# Patient Record
Sex: MALE | Race: WHITE | URBAN - METROPOLITAN AREA
[De-identification: names, ages, dates, MRNs, and addresses within clinical notes are randomized per-mention and may not be internally consistent; named-entity substitution may affect disease eponyms.]

---

## 2019-08-12 ENCOUNTER — OFFICE VISIT (OUTPATIENT)
Dept: SURGERY | Age: 44
End: 2019-08-12

## 2019-08-12 VITALS
RESPIRATION RATE: 20 BRPM | DIASTOLIC BLOOD PRESSURE: 92 MMHG | TEMPERATURE: 97.8 F | HEIGHT: 73 IN | WEIGHT: 315 LBS | BODY MASS INDEX: 41.75 KG/M2 | HEART RATE: 84 BPM | SYSTOLIC BLOOD PRESSURE: 145 MMHG

## 2019-08-12 DIAGNOSIS — E78.2 MIXED HYPERLIPIDEMIA: ICD-10-CM

## 2019-08-12 DIAGNOSIS — I10 ESSENTIAL HYPERTENSION: ICD-10-CM

## 2019-08-12 DIAGNOSIS — G47.33 OSA (OBSTRUCTIVE SLEEP APNEA): ICD-10-CM

## 2019-08-12 DIAGNOSIS — E66.01 MORBID OBESITY (HCC): Primary | ICD-10-CM

## 2019-08-12 RX ORDER — AZELASTINE 1 MG/ML
2 SPRAY, METERED NASAL
COMMUNITY
Start: 2018-11-02

## 2019-08-12 RX ORDER — FLUTICASONE PROPIONATE 50 MCG
SPRAY, SUSPENSION (ML) NASAL
COMMUNITY

## 2019-08-12 RX ORDER — CYCLOBENZAPRINE HCL 10 MG
10 TABLET ORAL
COMMUNITY
Start: 2018-10-08

## 2019-08-12 RX ORDER — LISINOPRIL 20 MG/1
TABLET ORAL
Refills: 6 | COMMUNITY
Start: 2019-07-19

## 2019-08-12 RX ORDER — ONDANSETRON 4 MG/1
8 TABLET, ORALLY DISINTEGRATING ORAL
COMMUNITY
Start: 2018-01-15 | End: 2019-08-12

## 2019-08-12 RX ORDER — BUDESONIDE AND FORMOTEROL FUMARATE DIHYDRATE 160; 4.5 UG/1; UG/1
AEROSOL RESPIRATORY (INHALATION)
COMMUNITY
Start: 2018-08-17

## 2019-08-12 RX ORDER — PREDNISONE 20 MG/1
TABLET ORAL
COMMUNITY
Start: 2019-06-10 | End: 2019-08-12

## 2019-08-12 RX ORDER — ALBUTEROL SULFATE 0.83 MG/ML
SOLUTION RESPIRATORY (INHALATION)
COMMUNITY
Start: 2018-02-05

## 2019-08-12 RX ORDER — AZELASTINE HYDROCHLORIDE 0.5 MG/ML
SOLUTION/ DROPS OPHTHALMIC 2 TIMES DAILY
COMMUNITY

## 2019-08-12 RX ORDER — SUMATRIPTAN 50 MG/1
TABLET, FILM COATED ORAL
COMMUNITY
Start: 2018-02-06

## 2019-08-12 RX ORDER — LEVOCETIRIZINE DIHYDROCHLORIDE 5 MG/1
TABLET, FILM COATED ORAL
COMMUNITY
Start: 2018-02-06

## 2019-08-12 RX ORDER — ALBUTEROL SULFATE 90 UG/1
AEROSOL, METERED RESPIRATORY (INHALATION)
COMMUNITY
Start: 2019-03-29

## 2019-08-12 NOTE — PROGRESS NOTES
Initial Consultation for Bariatric Surgery Template (Gastric Bypass)    Lena Aaron is a 38 y.o. male who comes into the office today for initial consultation for the surgical options for the treatment of morbid obesity. The patient initially identified obesity at the age of 25 and at age 26 weighed 220 lbs. He has tried a variety of unsupervised weight-loss attempts including Weight Watchers, registered dietician, family physician, Nutrisystem and Phentermine, but has yet to meet with lasting success. Maximum weight lost on a diet is about 15 lbs, but that the weight loss always seems to return. Today, the patient is  Height: 6' 1\" (185.4 cm) tall, Weight: 155.1 kg (342 lb) lbs for a Body mass index is 45.12 kg/m². It is due to the patient's severe obesity, which is further complicated by hypertension, hyperlipidemia and obstructive sleep apnea - CPAP  that the patient is now seeking out bariatric surgery, specifically, sleeve gastrectomy. Past Medical History:   Diagnosis Date    Arthritis     Basal cell carcinoma 2016    Depression     Hypercholesterolemia     Hypertension     Sleep apnea        Past Surgical History:   Procedure Laterality Date    HX OTHER SURGICAL  2016    Basal cell removed from nose    HX TONSIL AND ADENOIDECTOMY         Current Outpatient Medications on File Prior to Visit   Medication Sig Dispense Refill    albuterol (PROVENTIL VENTOLIN) 2.5 mg /3 mL (0.083 %) nebu albuterol sulfate 2.5 mg/3 mL (0.083 %) solution for nebulization      albuterol (VENTOLIN HFA) 90 mcg/actuation inhaler Ventolin HFA 90 mcg/actuation aerosol inhaler      budesonide-formoterol (SYMBICORT) 160-4.5 mcg/actuation HFAA Symbicort 160 mcg-4.5 mcg/actuation HFA aerosol inhaler      cyclobenzaprine (FLEXERIL) 10 mg tablet Take 10 mg by mouth.       levocetirizine (XYZAL) 5 mg tablet levocetirizine 5 mg tablet      lisinopril (PRINIVIL, ZESTRIL) 20 mg tablet   6    SUMAtriptan (IMITREX) 50 mg tablet sumatriptan 50 mg tablet      vortioxetine (TRINTELLIX) 5 mg tablet Trintellix 5 mg tablet      azelastine (OPTIVAR) 0.05 % ophthalmic solution Administer  to both eyes two (2) times a day. Use in affected eye(s)      azelastine (ASTELIN) 137 mcg (0.1 %) nasal spray 2 Sprays by Nasal route.  fluticasone propionate (FLONASE) 50 mcg/actuation nasal spray fluticasone propionate 50 mcg/actuation nasal spray,suspension       No current facility-administered medications on file prior to visit.         Allergies   Allergen Reactions    Pcn [Penicillins] Itching       Social History     Tobacco Use    Smoking status: Never Smoker    Smokeless tobacco: Never Used   Substance Use Topics    Alcohol use: Yes     Comment: occ    Drug use: Not on file       Family History   Problem Relation Age of Onset   Slaughter Cancer Mother         melanoma,     Hypertension Mother     High Cholesterol Mother     Hypertension Father     Heart Disease Father        Family Status   Relation Name Status    Mother  [de-identified]    Father  Alive       Review of Systems:  Positive in BOLD    CONST: Fever, weight loss, fatigue or chills  GI: Nausea, vomiting, abdominal pain, change in bowel habits, hematochezia, melena, and GERD - red sauces - rx with OTC   INTEG: Dermatitis, abnormal moles  HEENT: Recent changes in vision, vertigo, epistaxis, dysphagia and hoarseness  CV: Chest pain, palpitations, HTN, edema and varicosities  RESP: Cough, shortness of breath, wheezing, hemoptysis, snoring and reactive airway disease  : Hematuria, dysuria, frequency, urgency, nocturia and stress urinary incontinence   MS: Weakness, joint pain and arthritis - knees and back  ENDO: Diabetes, thyroid disease, polyuria, polydipsia, polyphagia, poor wound healing, heat intolerance, cold intolerance  LYMPH/HEME: Anemia, bruising and history of blood transfusions  NEURO: Dizziness, headache, fainting, seizures and stroke  PSYCH: Anxiety and depression      Physical Exam    Visit Vitals  BP (!) 145/92 (BP 1 Location: Left arm, BP Patient Position: At rest)   Pulse 84   Temp 97.8 °F (36.6 °C) (Oral)   Resp 20   Ht 6' 1\" (1.854 m)   Wt 155.1 kg (342 lb)   BMI 45.12 kg/m²       Pre op weight: 342  EBW: 157  Wt loss to date: 0       General: 38 y.o.) male in no acute distress. Morbidly obese in abdomen - android pattern  HEENT: Normocephalic, atraumatic, Pupils equal and reactive, nasopharynx clear, oropharynx clear and moist without lesions, heavy bags under his eyes. NECK: Supple, no lymphadenopathy, thyromegaly, carotid bruits or jugular venous distension. trachea midline  RESP: Clear to auscultation bilaterally, no wheezes, rhonchi, or rales, normal respiratory excursion  CV: Regular rate and rhythm, no murmurs, rubs or gallops. 3+/4 pulses in bilateral dorsalis pedis and posterior tibialis. No distal edema or varicosities. ABD: Soft, nontender, nondistended, normoactive bowel sounds, no hernias, no hepatosplenomegaly, easily palpable costal margins, android distribution  Extremities: Warm, well perfused, no tenderness or swelling, normal gait/station  Neuro: Sensation and strength grossly intact and symmetrical  Psych: Alert and oriented to person, place, and time. Impression:    Saurav Rene is a 38 y.o. male who is suffering from morbid obesity with a BMI of 45  and comorbidities including hypertension, hyperlipidemia, GERD, obstructive sleep apnea - CPAP and weight related arthopathies  who would benefit from bariatric surgery. We have had an extensive discussion with regard to the risks, benefits and likely outcomes of the operation. We've discussed the restrictive and malabsorptive nature of the gastric bypass and compared and contrasted with the sleeve gastrectomy. The patient understands the likelihood of losing approximately 80% of their excess weight in 12 to 18 months.  He also understands the risks including but not limited to bleeding, infection, need for reoperation, ulcers, leaks and strictures, bowel obstruction secondary to adhesions and internal hernias, DVT, PE, heart attack, stroke, and death. Patient also understands risks of inadequate weight loss, excess weight loss, vitamin insufficiency, protein malnutrition, excess skin, and loss of hair. We have reviewed the components of a successful postoperative course including requirement for a high protein, low carbohydrate diet, 60 oz a day of zero calorie liquids, daily vitamin supplementation, daily exercise, regular follow-up, and participation in support groups. At this time we will enroll the patient in our bariatric program, undertake routine laboratory evaluation, chest X-ray, EKG, possible UGI and evaluation by  nutritionist as well as psychologist and pending their satisfactory completion of the preop evaluation, plan to perform a gastric bypass.

## 2019-08-12 NOTE — PROGRESS NOTES
Anna Solitario is a 38 y.o. male who presents today with   Chief Complaint   Patient presents with    Morbid Obesity     Consult                Body mass index is 45.12 kg/m². 1. Have you been to the ER, urgent care clinic since your last visit? Hospitalized since your last visit? No    2. Have you seen or consulted any other health care providers outside of the [address] [place] [address] since your last visit? Include any pap smears or colon screening.  No

## 2019-08-29 ENCOUNTER — DOCUMENTATION ONLY (OUTPATIENT)
Dept: SURGERY | Age: 44
End: 2019-08-29

## 2019-08-29 NOTE — PROGRESS NOTES
[facility] [place] [facility] [address] [facility]  [id number] [place] [address], [address]    Patient's Name: Haley Lentz   Age: 38 y.o. YOB: 1975   Sex: male    Date:  08/12/2019    Session: 1 of 12  Surgeon:  Dr. Vielka Loyd     Height: 6'1\" Weight:    342      Lbs. BMI: 45     Starting Weight: 342       Do you smoke? no    Alcohol intake:    Number of drinks at a time:  1-2  Number of times a month: 1    Class Guidelines    Guidelines are reviewed with patient at the start of every class. 1. Patient understands that weight loss trial classes must be consecutive. Patient understands if they miss a class, it is their responsibility to contact me to reschedule class. I will reach out to patient after their first no show. 2.  Patient understands the expectations that weight maintenance/weight loss is expected during the classes. Failure to demonstrate changes may result in one extra month of weight loss trial, followed by going back to see the surgeon. Patient understands that they CAN NOT gain any weight during the weight loss trial.  Gaining weight will result in extra classes. 3. Patient is also instructed to be doing their labs, blood work, psych visit, support group and any other test that the surgeon has used while they are working on their weight loss trial.  4.  Patient was instructed to bring their blue binder to every class and appointment. Eating Habits and Behaviors    Today in class, we reviewed the key diet principles. I have talked to patient about pushing the fluid and working towards 64 ounces per day. We focused on following a low-calorie diet. Patient was instructed to count their carbohydrates and try to keep their daily intake under 100 grams per day and try to keep their daily protein at 80 grams per day. Patient was given examples of carbohydrates in starches.   Patient was encouraged to focus on meat and vegetables and begin cutting carbohydrates out. We talked about foods that are protein-based and how to incorporate those into their meals. I also reviewed with patient the importance of eating 3 meals per day and suggestions were made for breakfast items. Patient's current diet habits include:   Breakfast:  Meat no bread. Lunch: Fast food. Dinner:  Chicken, pizza, steak. Physical Activity/Exercise    Comments: We talked about exercise. Patient was given reasons of why exercise is so important and how that can help with their long-term success. I have encouraged patient to get a support system to help with the activity. Behavior Modification       Comments:  During today's lesson I discussed the importance of making up to two lifestyle changes a week and adding new changes as the other changes become routine. Examples of lifestyle changes are:  1. Not eating in front of the TV or computer. 2. Journaling intake via an mathieu or pen and paper. This reveals negative patterns such as mindless eating, stress eating and late night eating for other reason then hunger. Goals that patient wants to work on includes:  Increase activity. Decrease portions sizes and make healthier choices.       Nico Montiel RD

## 2019-09-29 ENCOUNTER — DOCUMENTATION ONLY (OUTPATIENT)
Dept: SURGERY | Age: 44
End: 2019-09-29

## 2019-09-29 NOTE — PROGRESS NOTES
[facility]  [address] [facility], [address], [place]  Patient's Name: Romulo Quiñonez   Age: 38 y.o. YOB: 1975   Sex: male    Date:  09/09/2019    Session: 2 over 12 months. Surgeon:  Eric Nelson    Height: 6'1\" Weight:    336      Lbs. BMI: 45     Starting Weight: 342        Do you smoke? no    Alcohol intake:    Number of drinks at a time:  1-2  Number of times a month. Class Guidelines    Guidelines are reviewed with patient at the start of every class. 1. Patient understands that weight loss trial classes must be consecutive. Patient understands if they miss a class, it is their responsibility to contact me to reschedule class. I will reach out to patient after their first no show. 2.  Patient understands the expectations that weight maintenance/weight loss is expected during the classes. Failure to demonstrate changes may result in one extra month of weight loss trial, followed by going back to see the surgeon. 3. Patient is also instructed to be doing their labs, blood work, psych visit, support group and any other test that the surgeon has used while they are working on their weight loss trial.    Changes Made Since Last Class:   Changed eating to low carb and low sugar. Eating Habits and Behaviors      Today we reviewed key diet principles. We talked about ways that patient can follow a low calorie diet. These included: Stopping all liquid calories and patient was given a list of fluid choices that would be appropriate. We talked about carbohydrates. Patient was educated that all carbohydrates turn to sugar and was encouraged to stick to the complex carbohydrates while in weight loss trials, but aim to keep less than 100 grams during weight loss trials. Patient was given a list of foods to not eat and drink due to high sugar, high fat, or high carbohydrate content.   Patient was also given a list of foods and drinks that are high protein, low carbohydrate, and would be appropriate to eat. Patient was given a list of fruit and what a portion size is and how many carbohydrates it contains. Patient was encouraged to keep fruit intake to a minimum. Patient was also given a list of 50 low carbohydrate snack options, but was also cautioned that some of the choices still were high in calories and portion control should be practiced. Patient's current diet habits include: sausages, eggs, chicken, broccoli, fish. Physical Activity/Exercise    Comments:     Currently for exercise, patient is walking. We talked about activities for patient to do, including walking, swimming, or chair exercises. Behavior Modification       Comments:   Patient was encouraged to food journal. I talked with patient about tracking their daily carbohydrate. We also talked about the importance of planning ahead. Lack of willpower is often a lack of planning. We also covered the need to get sufficient sleep and ways to accomplish that. We also discussed how important it is to not snack mindlessly in the evening and to consider dinner the last meal of the day. Goals for next month: Increase activity. Decrease portions size.       Michelle Fang RD

## 2019-10-10 ENCOUNTER — OFFICE VISIT (OUTPATIENT)
Dept: SURGERY | Age: 44
End: 2019-10-10

## 2019-10-10 VITALS
HEIGHT: 73 IN | DIASTOLIC BLOOD PRESSURE: 85 MMHG | OXYGEN SATURATION: 95 % | BODY MASS INDEX: 41.75 KG/M2 | TEMPERATURE: 98.3 F | SYSTOLIC BLOOD PRESSURE: 131 MMHG | WEIGHT: 315 LBS | HEART RATE: 68 BPM

## 2019-10-10 DIAGNOSIS — G47.33 OSA (OBSTRUCTIVE SLEEP APNEA): ICD-10-CM

## 2019-10-10 DIAGNOSIS — E66.01 MORBID OBESITY (HCC): Primary | ICD-10-CM

## 2019-10-10 DIAGNOSIS — I10 ESSENTIAL HYPERTENSION: ICD-10-CM

## 2019-10-10 DIAGNOSIS — E78.2 MIXED HYPERLIPIDEMIA: ICD-10-CM

## 2019-10-10 NOTE — PROGRESS NOTES
Bariatric Preoperative Progress Note    Subjective:     Geovanni Santoyo is a 38 y.o. male who presents today for followup of their candidacy for bariatric surgery. Since last seen, Geovanni Santoyo has been working through bariatric program towards sleeve gastrectomy . Past Medical History:   Diagnosis Date    Arthritis     Basal cell carcinoma 2016    Depression     Hypercholesterolemia     Hypertension     Sleep apnea        Past Surgical History:   Procedure Laterality Date    HX OTHER SURGICAL  2016    Basal cell removed from nose    HX TONSIL AND ADENOIDECTOMY         Current Outpatient Medications   Medication Sig Dispense Refill    albuterol (PROVENTIL VENTOLIN) 2.5 mg /3 mL (0.083 %) nebu albuterol sulfate 2.5 mg/3 mL (0.083 %) solution for nebulization      azelastine (ASTELIN) 137 mcg (0.1 %) nasal spray 2 Sprays by Nasal route.  budesonide-formoterol (SYMBICORT) 160-4.5 mcg/actuation HFAA Symbicort 160 mcg-4.5 mcg/actuation HFA aerosol inhaler      cyclobenzaprine (FLEXERIL) 10 mg tablet Take 10 mg by mouth.  fluticasone propionate (FLONASE) 50 mcg/actuation nasal spray fluticasone propionate 50 mcg/actuation nasal spray,suspension      levocetirizine (XYZAL) 5 mg tablet levocetirizine 5 mg tablet      lisinopril (PRINIVIL, ZESTRIL) 20 mg tablet   6    SUMAtriptan (IMITREX) 50 mg tablet sumatriptan 50 mg tablet      vortioxetine (TRINTELLIX) 5 mg tablet Trintellix 5 mg tablet      azelastine (OPTIVAR) 0.05 % ophthalmic solution Administer  to both eyes two (2) times a day.  Use in affected eye(s)      albuterol (VENTOLIN HFA) 90 mcg/actuation inhaler Ventolin HFA 90 mcg/actuation aerosol inhaler         Allergies   Allergen Reactions    Pcn [Penicillins] Itching       ROS:  Review of Systems:  Positives in Rita    Constitutional:  Unexpected weight gain/loss, night sweats,fatigue/maliase/lethargy, change in sleep, fever, rash  Eyes:  Visual changes, eye pain, floaters  ENT: Rhinorrhea, epistaxis, sinus pain, change in hearing, gingival bleeding, sore throat, dysphagia, dysphonia  CV:  Chest pain, shortness of breath, difficulty breathing, orthopnea, palpitations, loss of consciousness, claudication  Resp: Cough, wheeze, haemoptysis, sob, exercise intolerence  GI:  Abdominal pain, dysphagia, reflux, bloating, cramping. Obstipation, haematemesis, brbpr, hematochezia,dark tarry stools. Nausea, vomitting, diarrhea, constipation. : Change in frequency of urinatio-due to increase water intake, dysuria, hematuria, change in force of Stream  Neuro: Paresthesias, numbness, weakness, changes in balance, changes in speech, loss of control of bowel or bladder, headaches-history of migraines  Psych:Changes in depression, anxiety, sleep patterns, change in energy Levels  Endocrine: Temperature intolerance, dry skin, hair loss, fatigue,  Episodes of hypoglycemia, changes in libido  Heme: Anemia, pupura, petechia  Skin: Rashes, itchiness, excessive bruising  Musculoskeletal: weakness, arthropathy    Remainder of ROS as per HPI. Objective:     Physical Exam:  Visit Vitals  /85 (BP 1 Location: Right arm, BP Patient Position: Sitting)   Pulse 68   Temp 98.3 °F (36.8 °C) (Oral)   Ht 6' 1\" (1.854 m)   Wt 153 kg (337 lb 3.2 oz)   SpO2 95%   BMI 44.49 kg/m²         General: AAOX3, pleasant and cooperative to exam. Appropriately groomed. NAD. Non-toxic in appearance. Appears stated age. HENT: NC/AT. PERRLA. Extraocular motions are intact. Sclera anicteric, Conjunctiva Clear. Nares clear. Oropharynx pink, moist without exudate or erythema. Uvula Midline. Neck:  Supple, trachea is midline. No JVD, Lymphadenopathy. No bruits. Chest: Good equal bilateral expansion  Lungs: Clear to auscultation bilaterally without e/o crackles, wheezes or rhales. Heart: RRR, S1 and S2 noted. No c/r/m/g/vpmi. Abdomen: obese, soft and non-tender without distension. Good bowel sounds.  No vis/palp masses or pulsations. No organo-splenomegaly. No hernias to my exam. No e/o acute abdomen or peritoneal signs. Pelvis: Stable. :  Deferred  Rectal: Deferred  Extremities: Positive pulses in all 4 extremities. Baseline range of motion in all 4 extremities. Strength, sensation and reflexes intact, appropriate and equal in b/l upper and lower extremities. No C/C/E  Neuro: CN II-XII grossly intact without focal deficit. Ambulatory. Skin: Clean, warm and dry. Studies to date:     Labs: will complete bariatric lab work in three months at next midtrial      EKG: Pending- will receive from cardiology office     Nutritional evaluation: 3/12 complete. Next visit with [facility] November 14,2019    Psychiatric evaluation:complete and cleared to proceed    Support Group: Plans to attend Nov 14, 2019    Additional evaluations: none    Scheduled to see cardiologist Susan Kruger at [facility] for postop cardiac cath and clearance October 18, 2019 at 3pm.     Will wait to have bariatric labs complete in three month. Assessment:   Grace Woodard is a 38 y.o. male who is progressing through the bariatric preoperative evaluation. At this time, they are not yet an appropriate candidate for weight loss surgery. Mr. Anay Celestin has a reminder for a \"due or due soon\" health maintenance. I have asked that he contact his primary care provider for follow-up on this health maintenance. Plan:   -complete remainder of preop evaluation including nutrition classes, labs, cardiac clearance  -Patient voices understanding that weight gain during weight loss trial may result in cancellation of weight loss surgery.   -Follow up in three months for midtrial    A total of 25 minutes was spent with the patient, with > 50% of time spent in counseling and coordination of care.     ROBIN Perry-BC

## 2019-10-10 NOTE — PATIENT INSTRUCTIONS
If you have any questions or concerns about today's appointment, the verbal and/or written instructions you were given for follow up care, please call our office at [phone].     [facility] - [address], [address] [place] [address]    [phone] office  [phone]

## 2019-10-10 NOTE — PROGRESS NOTES
Vicki Renner is a 38 y.o. male (: 1975) presenting to address:    Chief Complaint   Patient presents with    Weight Management     LGBP Mid trial       Medication list and allergies have been reviewed with Vicki Renner and updated as of today's date. I have gone over all Medical, Surgical and Social History with Vicki Renner and updated/added the information accordingly. 1. Have you been to the ER, Urgent Care or Hospitalized since your last visit? NO      2. Have you followed up with your PCP or any other Physicians since your procedure/ last office visit? YES.   Dr Borrero Ra

## 2019-10-28 ENCOUNTER — DOCUMENTATION ONLY (OUTPATIENT)
Dept: SURGERY | Age: 44
End: 2019-10-28

## 2019-10-28 NOTE — PROGRESS NOTES
[facility] [place] [facility] [address], [address] [place] [address]    Patient's Name: Maggie Byrnes   Age: 38 y.o. YOB: 1975   Sex: male    Date:  10/10/2019    Session: 3 of  12  Surgeon:  Dr. Priya Ramirez     Height: 6'1\" Weight:    336      Lbs. BMI: 44     Starting Weight: 342       Lbs. Do you smoke? no    Alcohol intake:    Number of drinks at a time:  2  Number of times a month: 1    Class Guidelines    Guidelines are reviewed with patient at the start of every class. 1. Patient understands that weight loss trial classes must be consecutive. Patient understands if they miss a class, it is their responsibility to contact me to reschedule class. I will reach out to patient after their first no show. 2.  Patient understands the expectations that weight maintenance/weight loss is expected during the classes. Failure to demonstrate changes may result in one extra month of weight loss trial, followed by going back to see the surgeon. 3. Patient is also instructed to be doing their labs, blood work, psych visit, support group and any other test that the surgeon has used while they are working on their weight loss trial.  4. Patient is instructed to bring their education binder to all classes. Changes Made Since Last Class:   Started walking. Eating Habits and Behaviors      Today we reviewed key diet principles. We talked about patient following a low calorie/low carbohydrate diet while they are in weight loss trials. To achieve this, patient is encouraged to avoid liquid calories, including alcohol, soda, sweet tea, and fruit juices. Patient can cut carbohydrates by trying to stick to meat and vegetables. Patient can also eat eggs, cheese, and good fat, while trying to eliminate starches, such as pasta, rice, crackers, chips, popcorn.     Some of the food-related suggestions included drinking plenty of water or calorie-free beverages prior to their meal.  Patient is encouraged to to fill up on protein first, which is the ultimate fill-me up food. We talked about the importance of eating breakfast and the effects that can happen if one skips meals, which includes eating larger portions, snacking more, and decreasing their metabolism. With the suggestions in the power point, patient will be able to decrease their calories and carbohydrate intake. Patient's current diet habits include:   4-5 meals consisting of sausage or protein drink, salad and meat and protein drink. Physical Activity/Exercise    Comments: We talked about the importance of establishing a work out routine. Patient is currently doing more walking for activity. Behavior Modification       Comments:   Some of the behavior tips that were included in the power point, include being choosy about night time snacking. Patient was encouraged to make the TV a no eating zone and not eat after 7 pm.  Patient is also encouraged to keep a food journal.      I also reminded all patients to make their psychologist appointment and attend at least 1 support group. Goals for the next month include: Increase activity. Decrease portion size and cut more carbs.       Vimal Bone, RD

## 2019-11-25 ENCOUNTER — DOCUMENTATION ONLY (OUTPATIENT)
Dept: SURGERY | Age: 44
End: 2019-11-25

## 2019-11-25 NOTE — PROGRESS NOTES
[facility]  [address], [address] [place] [address]    Patient's Name: Viji Anguiano   Age: 38 y.o. YOB: 1975   Sex: male    Date:  11/14/2019    Session: 4 of 12  Surgeon:  Dr. Darion Costa    Height: 6'1\" Weight:    344  Lbs. BMI: 44       Starting Weight:  342 Lbs. Do you smoke? no    Alcohol intake:    Number of drinks at a time:  1  Number of times a month:1    Class Guidelines    Guidelines are reviewed with patient at the start of every class. 1. Patient understands that weight loss trial classes must be consecutive. Patient understands if they miss a class, it is their responsibility to contact me to reschedule class. I will reach out to patient after their first no show. 2.  Patient understands the expectations that weight maintenance/weight loss is expected during the classes. Failure to demonstrate changes may result in one extra month of weight loss trial, followed by going back to see the surgeon. 3. Patient is also instructed to be doing their labs, blood work, psych visit, support group and any other test that the surgeon has used while they are working on their weight loss trial.        Changes Made Since Last Class:   I have increased my exercise. Dietary Instruction    During today's class we continued to focus on the key diet principles. Patient was instructed to follow a low carbohydrate diet, focusing on meat and vegetables. Patient was instructed to stop liquid calories and aim for 64 ounces of water per day. In class, I also gave patient a power point on surviving the holidays. Some of the tips included survival tips for parties, including bringing their own low carbohydrate dish to a potluck dinner and surveying the buffet line before they start filling up their plate.   Patient was given cooking alternatives, including using Splenda or Stevia for sugar, substituting applesauce for oil in recipes, and using low fat plain yogurt instead of sour cream in dips. Patient was also encouraged to be mindful of calories in alcohol. Patient's diet habits include:   3 meals consisting of eggs, protien shake, baked chicken, broccoli, green beans. Physical Activity/Exercise    Patient is currently doing more walking  for activity. Today's power point on surviving the holidays also included tips on exercising. This included being creative during the holiday, walking stairs, mall walking, getting resistance bands. Patient was encouraged not to be afraid to excuse themselves from the table to go for a walk after they eat. Behavior Modification    Reinforced behavior changes to make. Patient was encouraged to keep their emotions in check. Try to HALT and focus on whether they are eating out of hunger or if they are eating out of emotions. Other eating behaviors included surveying the buffet line before starting to fill up their plate. Goals that patient set for next month include: Increase exercise routine to include more days at the gym. Eat healthier and make better food choices by increasing healthy food variety.       Yvonne Granda, ELI

## 2019-12-31 ENCOUNTER — DOCUMENTATION ONLY (OUTPATIENT)
Dept: SURGERY | Age: 44
End: 2019-12-31

## 2019-12-31 NOTE — PROGRESS NOTES
[employer] [facility] [place]                                                                  [facility]                                                               [address] [place] [address]      Name: Grace Woodard  : 1975      Date: 2019         Session:  5   12   Surgeon:   Dr. David Inman      Height: 6'   Weight:    348      Lbs. Starting Weight: 342    BMI:  44       Do you smoke? no    Alcohol intake:    Patient drinks occasionally. Number of drinks at a time. 1-2  Number of times a month: 1    Class Guidelines    Guidelines are reviewed with patient at the start of every class. 1. Patient understands that weight loss trial classes must be consecutive. Patient understands if they miss a class, it is their responsibility to contact me to reschedule class. I will reach out to patient after their first no show. 2.  Patient understands the expectations that weight maintenance/weight loss is expected during the classes. Failure to demonstrate changes may result in one extra month of weight loss trial, followed by going back to see the surgeon. Patient understands that they CAN NOT gain any weight during the weight loss trial.  Gaining weight will result in extra classes. 3. Patient is also instructed to be doing their labs, blood work, psych visit, support group and any other test that the surgeon has used while they are working on their weight loss trial.  4.  Patient was instructed to bring their blue binder to every class and appointment. Eating Habits and Behaviors    Today in class, we reviewed the key diet principles. Specifically, patient was instructed to watch their carbohydrate intake. We talked about foods that have carbohydrates in them and alternatives in place of this.   Patient was encouraged to start cutting out bread, rice, pasta, and other starches. Patient is encouraged to work towards 64 ounces of fluid per day, avoiding soda, sweet tea, and fruit juices. We also discussed how to make the best choices when eating out. We looked at fast food traps and dining out strategies to stay in control. Patient was also given ideas from various restaurants that would be a healthier option. We also discussed vitamin protocol if patient pursues surgical weight loss. Patient's current diet habits include:   3 meals consisting of meats, vegetables a starch and snacks on occasion. Physical Activity/Exercise    Comments: We talked about exercise. Patient was given reasons of why exercise is so important and how that can help with their long-term success. I have encouraged patient to get a support system to help with the activity. Currently for activity, patient is doing more walking. Behavior Modification       Comments:  I have also talked to patient about some behavior changes including taking 20-30 minutes to eat a meal.  Patient is encouraged to get a timer and use smaller utensils to help them stretch their meal out. Patient is also encouraged to get into a routine of not eating after 7 pm and make the TV a no eating zone. Goals that patient wants to work on includes:  1. Increase activity. 2. Decrease carbohydrate intake and portions sizes.       Gwendolyn Dunbar RD

## 2020-01-31 ENCOUNTER — DOCUMENTATION ONLY (OUTPATIENT)
Dept: SURGERY | Age: 45
End: 2020-01-31

## 2020-02-01 NOTE — PROGRESS NOTES
[facility]  [address] [place] [address], [address] [place] [address]    Patient's Name: Nida Narayan   Age: 38 y.o. YOB: 1975   Sex: male    Date:  01/16/2020        Session: 6 of 12    Surgeon:  Dr. Theodore Bhakta     Height: 6'1\" 1  Weight:    535      Lbs. Starting Weight:  342  Lbs    BMI:  44      Do you smoke? NO    Alcohol intake:    Number of drinks at a time:  1  Number of times a  Month:    Class Guidelines    Guidelines are reviewed with patient at the start of every class. 1. Patient understands that weight loss trial classes must be consecutive. Patient understands if they miss a class, it is their responsibility to contact me to reschedule class. I will reach out to patient after their first no show. 2.  Patient understands the expectations that weight maintenance/weight loss is expected during the classes. Failure to demonstrate changes may result in one extra month of weight loss trial, followed by going back to see the surgeon. 3. Patient is also instructed to be doing their labs, blood work, psych visit, support group and any other test that the surgeon has used while they are working on their weight loss trial.    Changes Made Since Last Class:   Eating less. Eating Habits and Behaviors      Today we reviewed key diet principles. We talked about snack ideas that would focus more on protein. We also talked about the benefits of filling up on protein first and keeping the daily carbohydrate intake to less than 100 grams per day. Patient was instructed to increase fluid intake to 64 ounces per day and stop all carbonation, caffeine, and sugary drinks. During class, we talked about the importance of getting on a routine of eating 3 meals a day, eating within one hour of waking up, and not going longer than 4 hours without fueling the body again. I also talked with patient about some meal ideas.       Patient's current diet habits include: 3 meals consisting of meats, vegetables, starches and occasional snacks. Physical Activity/Exercise    Comments:     Currently for exercise, patient is walking. We talked about activities for patient to do, including walking, swimming, or chair exercises. Behavior Modification       Comments:   During class, I reviewed a power point with patients called, \"Assessing Your Readiness to Change. \"  During this power point, patient was asked to self-evaluate themselves. At the end, we tallied the scores to determine how ready they are to make changes for the surgery. For the New Year's, I had patient set New Year's resolutions, including a food-related goal, exercise-related goal, and behavior goal.  Patient was encouraged to track the goals on a daily basis using the check off list I provided. Goals should be SMART, specific, measurable, attainable, realistic, and time-orientated. Patient's Goals are:   Food-related goal: to cut all carbs out. Exercise-related goal: 5 times per week.       Meghann Moran RD

## 2020-02-29 ENCOUNTER — DOCUMENTATION ONLY (OUTPATIENT)
Dept: SURGERY | Age: 45
End: 2020-02-29

## 2020-02-29 NOTE — PROGRESS NOTES
[facility] [address]  [place], [place]      Patient's Name: Hugh Clark   Age: 38 y.o. YOB: 1975   Sex: male    Date:   02/20/2020            Session: 7 of 12  Surgeon:  Dr. Mallorie Ho     Height: 6'1\" Weight:    342      Lbs. Starting Weight: 342   Lbs. BMI:  44    Do you smoke? No    Alcohol intake:   I drink occasionally:    Number of drinks at a time:  1-2  Number of times a month: 1    Class Guidelines    Guidelines are reviewed with patient at the start of every class. 1. Patient understands that weight loss trial classes must be consecutive. Patient understands if they miss a class, it is their responsibility to contact me to reschedule class. I will reach out to patient after their first no show. 2.  Patient understands the expectations that weight maintenance/weight loss is expected during the classes. Failure to demonstrate changes may result in one extra month of weight loss trial, followed by going back to see the surgeon. 3. Patient is also instructed to be doing their labs, blood work, psych visit, support group and any other test that the surgeon has used while they are working on their weight loss trial.   Patient understands that they CAN NOT gain any weight during the weight loss trial.  Gaining weight will result in extra classes    Changes Made Since Last Class:   Stuck to low carbs. Eating Habits and Behaviors      Today we started off class talking about the Key Diet Principles. Patient was encouraged to start drinking 64 ounces of fluid per day. Patient was encouraged to start cutting out soda, caffeine, carbonation, sweet tea, fruit juice, and fruit smoothies. Patient was also instructed to fill up on meat, fish, vegetables, eggs, cheese, and some fruit.   We also talked about protein drinks and patient was encouraged to start trying these, using them either for a meal replacement or a substitute for a current meal, which may be higher in carbohydrates. We talked about ways to lower carbohydrates and start trying substitutes, such as zucchini noodles and cauliflower rice. Patient's current diet habits include:   3 meals consisting of eggs, protein, vegetables, starch and occasional snacks. Physical Activity/Exercise    Comments:     Currently for exercise, patient is walking. We talked about activities for patient to do, including walking, swimming, or chair exercises. I also talked with patient about doing some strength training, which helps the metabolism, as well. Goals have been set. Behavior Modification       Comments:   I also gave a handout that included  Behavior Change ideas and its impact on Weight Loss. Some of the suggestions in the power point included food journaling. Patient was also given some strategies to follow, such as cooking just enough for the meal and not putting serving bowls on the table. Patient was also encouraged to restrict where they are eating and to avoid mindless eating throughout the day. One goal that patient has set for next month is:  Exercise longer and more often.     Nidhi Auguste RD

## 2020-03-18 ENCOUNTER — DOCUMENTATION ONLY (OUTPATIENT)
Dept: SURGERY | Age: 45
End: 2020-03-18

## 2020-03-18 NOTE — PROGRESS NOTES
[facility] [address]  [place], [place]    Patient's Name: Roger Kawasaki   Age: 38 y.o. YOB: 1975   Sex: male    Date: 03/18/2020    Session: 6 of  15  Surgeon:  Dr. Steven Jackson    Height: 6'1\" Weight:    333      Lbs. Starting Weight: 342    Lbs. BMI: 44    Do you smoke? no    Alcohol intake:    Number of drinks at a time:  1-2  Number of times a month:1    Class Guidelines    Guidelines are reviewed with patient at the start of every class. 1. Patient understands that weight loss trial classes must be consecutive. Patient understands if they miss a class, it is their responsibility to contact me to reschedule class. I will reach out to patient after their first no show. 2.  Patient understands the expectations that weight maintenance/weight loss is expected during the classes. Failure to demonstrate changes may result in one extra month of weight loss trial, followed by going back to see the surgeon. Patient understands that they CAN NOT gain any weight during the weight loss trial.  Gaining weight will result in extra classes. 3. Patient is also instructed to be doing their labs, blood work, psych visit, support group and any other test that the surgeon has used while they are working on their weight loss trial.  4.  Patient was instructed to bring their blue binder to every class and appointment. Changes Made Since Last Class:   Walking more. Eating Habits and Behaviors    Today in class, we reviewed the key diet principles. I have talked to patient about pushing the fluid and working towards 64 ounces per day. Patient was given ideas of liquids that would be okay. Patient was encouraged to cut out liquid calories, such as soda and sweet tea. We talked about the reasons that sugar sweetened beverages can promote weight gain. Sugar is highly palatable.   Excessive consumption of sugar can trigger an exaggerated release of dopamine, which can promote a compulsive drive to consume more sugar sweetened beverages. Also, satiety is not reached with liquid calories the same way it does with solid calories. In class, we also talked about focusing on protein and low carbohydrates. Patient was encouraged during the weight loss trial to keep their carbohydrate less than 100 grams per day and their protein level at 60-80 grams per day. We talked about meal choices and snack ideas. Patient's current diet habits include:   3 meals consisting of a protein, starch, vegetables and snacks. Physical Activity/Exercise    Comments: We talked about exercise. Patient was given reasons of why exercise is so important and how that can help with their long-term success. I have encouraged patient to get a support system to help with the activity. Currently for activity, patient is walking . Behavior Modification       Comments:  During today's lesson, I also spent some time talking about behavior changes. I talked to patient about the importance of taking vitamins post op and we reviewed the vitamins that patients will be taking post op. Patient will hear this again at pre op class before surgery. Patient had the opportunity to ask questions about these vitamins that will be lifelong. Goals that patient wants to work on includes:  Patient is purchasing workout stuff to work out at home more.        Cipriano Aviles, ELI  03/18/2020

## 2020-04-28 ENCOUNTER — DOCUMENTATION ONLY (OUTPATIENT)
Dept: SURGERY | Age: 45
End: 2020-04-28

## 2020-04-28 NOTE — PROGRESS NOTES
[facility]  [address] [place] [address], [facility] [address], [place] [address]    Patient's Name: Maxi Jackson   Age: 38 y.o. YOB: 1975   Sex: male    Date:  04/23/2020              Session: 5 of 12   Surgeon:  Dr. Winston Garcia    Height: 6'1\"   Weight:    321      Lbs. Starting Weight: 342 Lbs. BMI: 44         Do you smoke? no    Alcohol intake:   I drink occasionally. Number of drinks at a time:  1-2  Number of times a month: 1    Class Guidelines    Guidelines are reviewed with patient at the start of every class. 1. Patient understands that weight loss trial classes must be consecutive. Patient understands if they miss a class, it is their responsibility to contact me to reschedule class. I will reach out to patient after their first no show. 2.  Patient understands the expectations that weight maintenance/weight loss is expected during the classes. Failure to demonstrate changes may result in one extra month of weight loss trial, followed by going back to see the surgeon. Patient understands that they CAN NOT gain any weight during the weight loss trial.  Gaining weight will result in extra classes. 3. Patient is also instructed to be doing their labs, blood work, psych visit, support group and any other test that the surgeon has used while they are working on their weight loss trial.  4.  Patient was instructed to bring their blue binder to every class and appointment. Changes Made Since Last Class:   Portions control. Carb control. Eating Habits and Behaviors      We started off class today by reviewing key diet principles. Patient was given a very specific list of foods that they can eat, which included meat, fish, vegetables, eggs, cheese, fats, soy, and berries. Patient was also given a list of foods that should be avoided.   These included sweets (candy, soda, baked goods, ice cream), and starches, including pasta, rice, crackers, chips, oatmeal, bread. We talked about appropriate protein-based snacks, including deli meat, low fat cheese, yogurt, hummus, small handful of almonds. Patient's current diet habits include:   3 or more meals consisting of protein, vegetables, starches and occasional snacks. Physical Activity/Exercise    Comments:     Currently for exercise, patient is walking. We talked about activities for patient to do, including more walking, swimming, or chair exercises. I also talked with patient about doing some strength training, which helps the metabolism, as well. Behavior Modification       Comments:  I discussed behaviors that can help with one's metabolism. These include not skipping meals, drinking plenty of water, getting healthy sleep and maintaining an exercise routine. One goal for next month includes:  1. Exercise more.         Levar Peterson RD

## 2020-05-27 ENCOUNTER — DOCUMENTATION ONLY (OUTPATIENT)
Dept: SURGERY | Age: 45
End: 2020-05-27

## 2020-05-27 NOTE — PROGRESS NOTES
[facility] [address]  [place], [place] [address]    Patient's Name: Thomas Bansal   Age: 38 y.o. YOB: 1975   Sex: male    Date:  05/2121/2020            Session: 8 of 12   Surgeon:  Dr. Michelle Geronimo      Height: 6' 1 \"   Weight:    312      Lbs. Starting Weight: 342   Lbs. BMI: 41     Do you smoke? no    Alcohol intake:    I drink occasionally. Number of drinks at a time:  1-2  Number of times a week: 1    Class Guidelines    Guidelines are reviewed with patient at the start of every class. 1. Patient understands that weight loss trial classes must be consecutive. Patient understands if they miss a class, it is their responsibility to contact me to reschedule class. I will reach out to patient after their first no show. 2.  Patient understands the expectations that weight maintenance/weight loss is expected during the classes. Failure to demonstrate changes may result in one extra month of weight loss trial, followed by going back to see the surgeon. Patient understands that they CAN NOT gain any weight during the weight loss trial.  Gaining weight will result in extra classes. 3. Patient is also instructed to be doing their labs, blood work, psych visit, support group and any other test that the surgeon has used while they are working on their weight loss trial.  4.  Patient was instructed to bring their blue binder to every class and appointment. Changes Made Since Last Class:   Eating less. Exercising. Eating Habits and Behaviors      Today in class we talked about the key diet principles. We start off each class talking about these principles, which include cutting out liquid calories and focusing on water or other non-calorie, non-carbonated drinks. We also spent time talking about carbohydrates, including foods that have carbohydrates and the goal to keep daily carbohydrates under 100 grams per day. Patient was given ideas of meal and snack choices that are lower in carbohydrates and focus more on protein. Patient was encouraged to start trying protein shakes and was given a list of suggestions. The main topic of class today was: Portion Control. We reviewed in class a power point filled with tips on ways to control portions, including using smaller plates, boxing up portions at a restaurant before starting to eat, and not eating from the container, but rather portioning snacks into smaller bags. Patient's were encouraged to food journal, which helps increase awareness of what and how much they are eating. It was emphasized to patient the importance of reading labels and portion sizes, but also applying these portion sizes. Patient was given a list of items that can help to make portion control easier. For example, a deck of cards or a palm of a hand is a proper portion of meat, a fist is a cup or a proper serving of vegetables. Patient was given 10 tips to help with the portion control. Patient's current diet habits include:   Patients daily intake is composed of 3 meals consisting of a protein source, starch, vegetables and occasional snacks of Atkins snacks, cheese and pork rinds. Physical Activity/Exercise    Comments:     Currently for exercise, patient is walking more. Patient was given a list of ideas for activity and was encouraged to incorporate 30 minutes a day into their daily routine. Behavior Modification       Comments: In class, we also focused on the behavior aspects of weight management. This includes being a mindful eater and not eating in front of the TV. Patient is also encouraged to take 20 minutes to eat a meal and eat at a table. One goal for next month includes:  1. Move and exercise more.         Pollo Alegre RD

## 2020-06-18 ENCOUNTER — DOCUMENTATION ONLY (OUTPATIENT)
Dept: SURGERY | Age: 45
End: 2020-06-18

## 2020-06-18 NOTE — PROGRESS NOTES
[facility]  [address] [address], [facility]    Patient's Name: Kenneth Hawkins   Age: 38 y.o. YOB: 1975   Sex: male    Date: 06/18/2020    Session: 6 of  15  Surgeon:  Dr. Krys Hoang      Height: 6'1\" Weight:    301      Lbs. Starting Weight: 342       Lbs. BMI: 41      Do you smoke? no    Alcohol intake:    I drink occasionally. Class Guidelines    Guidelines are reviewed with patient at the start of every class. 1. Patient understands that weight loss trial classes must be consecutive. Patient understands if they miss a class, it is their responsibility to contact me to reschedule class. I will reach out to patient after their first no show. 2.  Patient understands the expectations that weight maintenance/weight loss is expected during the classes. Failure to demonstrate changes may result in one extra month of weight loss trial, followed by going back to see the surgeon. Patient understands that they CAN NOT gain any weight during the weight loss trial.  Gaining weight will result in extra classes. 3. Patient is also instructed to be doing their labs, blood work, psych visit, support group and any other test that the surgeon has used while they are working on their weight loss trial.  4.  Patient was instructed to bring their blue binder to every class and appointment. Changes Made Since Last Class:   Decreased carbs. Increased movement. Smaller portions. Eating Habits and Behaviors    Today in class, I reviewed a hand out with meal ideas for better planning. We also discussed Nutrition, Behavior, and Exercise changes to start working on. Some of the eating behaviors that we discussed included the importance of eating breakfast and a list of sample breakfast foods was provided. We also talked about avoiding liquid calories.   Patient is encouraged to aim for 64 ounces of fluid per day and trying to get them from water, tea bags and water infuser tools. Patient's current diet habits include:   3 meals consisting of a protein source, carbohydrates, vegetables and occasional snacks of cheese, pork rinds, Atkins Bar. .        Physical Activity/Exercise    Comments: We talked about exercise. Patient was given reasons of why exercise is so important and how that can help with their long-term success. I have encouraged patient to get a support system to help with the activity. Currently for activity, patient is doing more walking. Behavior Modification       Comments: We also talked about behavior modifications. We talked about eating triggers, such as eating in front of the TV and solutions, such as making the TV a no eating zone. If patient is eating out out of emotion, food will only temporarily solve that. Patient is encouraged to HALT and assess if they are eating because they are Hungry, or out of emotions: Anxious, Lonely, Tired, which the food will only temporarily solve. We also talked about ways to prevent relapse. Goals that patient wants to work on includes:  1. Move more. 2.  Continue monitoring carbs.       Eugenio Little RD

## 2020-07-16 ENCOUNTER — DOCUMENTATION ONLY (OUTPATIENT)
Dept: SURGERY | Age: 45
End: 2020-07-16

## 2020-07-16 NOTE — PROGRESS NOTES
[facility]  [address] [facility], [facility] [address] [facility], [place]    Patient's Name: Grupo Coleman   Age: 41 y.o. YOB: 1975   Sex: male    Date:   07/16/2020           Session: 15 of  12   Surgeon:  Dr. Vasiliy Hurt      Height: 6'1\"   Weight:    294      Lbs. Starting Weight:   342   Lbs. BMI: 38.8       Do you smoke? no    Alcohol intake:    I do not drink at all. Class Guidelines    Guidelines are reviewed with patient at the start of every class. 1. Patient understands that weight loss trial classes must be consecutive. Patient understands if they miss a class, it is their responsibility to contact me to reschedule class. I will reach out to patient after their first no show. 2.  Patient understands the expectations that weight maintenance/weight loss is expected during the classes. Failure to demonstrate changes may result in one extra month of weight loss trial, followed by going back to see the surgeon. Patient understands that they CAN NOT gain any weight during the weight loss trial.  Gaining weight will result in extra classes. 3. Patient is also instructed to be doing their labs, blood work, psych visit, support group and any other test that the surgeon has used while they are working on their weight loss trial.  4.  Patient was instructed to bring their blue binder to every class and appointment. Changes Made Since Last Class:   Exercise more. Eating Habits and Behaviors    Today in class, we started talking about the key diet principles. We first focused on stopping liquid calories. Patient was also educated on carbohydrates. Patient was instructed to start cutting out bread, rice, and pasta from the diet and start focusing more on meat and vegetables. I then gave a power point, which focused on Label Reading.   In class, I gave patients a labels and we worked through a series of questions to help patients have a better understanding of label reading. Patient was instructed to review the serving size. Patient was encouraged to focus on protein and carbohydrates. We also did a few label reading activities to help the patient become more familiar with label reading. Patient's current diet habits include:   3 meals consisting of a a protein source, a starch option and sometimes a vegetable options. For snacks the patient often chooses Atkins bars, pork rinds, cheese. Physical Activity/Exercise    Comments: We talked about exercise. Patient was given reasons of why exercise is so important and how that can help with their long-term success. I have encouraged patient to get a support system to help with the activity. Currently for activity, patient is doing more walking. Behavior Modification       Comments:  Behavior modifications were reinforced. This included not eating in front of the TV, which could lead to bigger portions and eating when one is not hungry. We also talked about the importance of eating 3 meals per day. Patient was encouraged to food journal to keep their daily carbohydrates less than 30 grams per meal.      Goals that patient wants to work on includes:  1. Cut more carbs. 2.  Exercise more.       Adamaris Castano RD

## 2020-08-20 ENCOUNTER — DOCUMENTATION ONLY (OUTPATIENT)
Dept: SURGERY | Age: 45
End: 2020-08-20

## 2020-08-20 NOTE — PROGRESS NOTES
Nutrition Evaluation    Patient's Name: Susan Harper   Age: 41 y.o. YOB: 1975   Sex: male    Height: 6'1\" Weight: 294 BMI:  38  Starting Weight:  342        Smoking Status:  no  Alcohol Intake:   I do not drink at all. Changes made during classes include:  Decreased carbs. Two things that patient learned during this weight loss trial:  The importance of activity. The importance of water. Summary:  I feel that Susan Harper has demonstrated appropriate diet changes and is ready to move forward with surgery. Patient has been briefed on the importance of the protein drinks, vitamins, and the transition of the diet stages. Patient understands that the long-term diet will focus on protein and vegetables. Patient understand the effects of carbohydrates after surgery and what reactive hypoglycemia is. Patient is aware that they will be attending pre-op class 2 weeks before surgery and will get more detailed information on the post-op diet guidelines. Patient will see me again at 6 weeks post-op. At this 6 week visit, RD will assess how patient is tolerating soft protein and advance to vegetables, if tolerating soft protein without difficulty. Patient will also see RD again at 9 months post-op. This visit will assess patient's compliance with current protocol, including diet, vitamins, protein shakes, and exercise. Post-op diet guidelines will be reinforced. RD is available for questions and to meet with patient outside of the 6 week and 9 month post-op visit. We spent a lot of time talking about the vitamins. Patient understands the importance of being compliant with the diet protocol and the complications and risks that can occur if they are non-compliant with the nutritional protocol. Patient has attended at least one support group.     Candidate for surgery:  Yes    Gage Vazquez RD  8/20/2020

## 2020-08-21 ENCOUNTER — DOCUMENTATION ONLY (OUTPATIENT)
Dept: SURGERY | Age: 45
End: 2020-08-21

## 2020-08-21 DIAGNOSIS — E66.01 MORBID OBESITY (HCC): Primary | ICD-10-CM

## 2020-08-21 DIAGNOSIS — Z01.818 PREOP EXAMINATION: ICD-10-CM

## 2020-08-21 DIAGNOSIS — I10 ESSENTIAL HYPERTENSION: ICD-10-CM

## 2020-08-21 NOTE — PROGRESS NOTES
Pt has completed nutrition. He is still missing labs and an EKG. Can you place an order for those please? Also, can you check behind me to see if I am missing his Cardiac clearance? I looked but couldn't find anything. Thanks, Alley Fletcher.